# Patient Record
Sex: MALE | Race: WHITE | NOT HISPANIC OR LATINO | Employment: FULL TIME | URBAN - METROPOLITAN AREA
[De-identification: names, ages, dates, MRNs, and addresses within clinical notes are randomized per-mention and may not be internally consistent; named-entity substitution may affect disease eponyms.]

---

## 2022-08-23 NOTE — PROGRESS NOTES
"[facility] CARDIOLOGY    New Patient Office Visit    Patient Name: Ric Nickerson  : 1975   MRN: [id number]   Care Team: Patient Care Team:  Milly Cunningham APRN as PCP - General (Nurse Practitioner)  Chema Rodriguez MD as Cardiologist (Cardiology)  Milly Cunningham APRN as Nurse Practitioner (Nurse Practitioner)    Chief Complaint   Patient presents with   • Chest Pain   • Dizziness   • Irregular Heart Beat     HPI: Ric Nickerson is a 47 y.o. male who presents today for new diagnosis of atrial fibrillation.  In July he had a day where he was surprised by his alarm clock in the morning and began having palpitations afterwards.  He describes a sensation as \"butterflies\" but denied any significant chest pain or dyspnea.  The feeling persisted and he was able to drive himself to the emergency department where he was diagnosed with A. fib with RVR.  He was admitted to the hospital and treated with a medication which sounded like Cardene when I listed multiple medications for him and he reverted to sinus rhythm.  Since that time he denies any further episodes of palpitations.  He was referred for home sleep study by his primary care NP which was reportedly positive and he is now working on scheduling a full sleep study.  He does report some alcohol use throughout the week.    He does report episodes of palpitations around  and he underwent heart catheterization here at that time.  However, he decreased his caffeine intake and stop smoking and did not have palpitations after that.    Subjective   Review of Systems   Constitutional: Negative for activity change, unexpected weight gain and unexpected weight loss.   HENT: Negative.    Eyes: Negative.    Respiratory: Positive for apnea. Negative for shortness of breath.         Snoring   Cardiovascular: Positive for palpitations. Negative for chest pain and leg swelling.   Endocrine: Negative.    Genitourinary: " "Negative.    Musculoskeletal: Negative.    Skin: Negative.    Allergic/Immunologic: Negative.    Neurological: Negative.    Hematological: Negative.    Psychiatric/Behavioral: The patient is nervous/anxious.        Past Medical History:   Diagnosis Date   • Abnormal ECG    • Anxiety    • Atrial fibrillation (HCC)    • Gout        Past Surgical History:   Procedure Laterality Date   • CARDIAC CATHETERIZATION       Social History     Socioeconomic History   • Marital status:    Tobacco Use   • Smoking status: Former Smoker     Quit date:      Years since quittin.6   • Smokeless tobacco: Current User     Types: Chew   Substance and Sexual Activity   • Alcohol use: Yes     Alcohol/week: 4.0 - 5.0 standard drinks     Types: 4 - 5 Cans of beer per week   • Drug use: Never   • Sexual activity: Defer       Family History   Problem Relation Age of Onset   • No Known Problems Mother    • No Known Problems Father        Social History     Tobacco Use   Smoking Status Former Smoker   • Quit date:    • Years since quittin.   Smokeless Tobacco Current User   • Types: Chew     No Known Allergies      Current Outpatient Medications:   •  aspirin 81 MG EC tablet, Take 81 mg by mouth Daily., Disp: , Rfl:   •  metoprolol succinate XL (TOPROL-XL) 25 MG 24 hr tablet, Take 25 mg by mouth Daily., Disp: , Rfl:     Objective     Vitals:    22 0944   BP: 118/76   BP Location: Right arm   Patient Position: Sitting   Pulse: 69   SpO2: 98%   Weight: 132 kg (292 lb)   Height: 182.9 cm (72\")     Body mass index is 39.6 kg/m².    Physical Exam  Constitutional:       Appearance: Normal appearance.   HENT:      Head: Normocephalic and atraumatic.      Nose: Nose normal. No congestion.      Mouth/Throat:      Mouth: Mucous membranes are moist.   Eyes:      General: No scleral icterus.     Conjunctiva/sclera: Conjunctivae normal.   Neck:      Vascular: No carotid bruit.   Cardiovascular:      Rate and Rhythm: Normal " rate and regular rhythm.      Pulses: Normal pulses.      Heart sounds: No murmur heard.  Pulmonary:      Effort: Pulmonary effort is normal. No respiratory distress.      Breath sounds: Normal breath sounds. No wheezing or rales.   Abdominal:      General: Bowel sounds are normal. There is no distension.      Palpations: Abdomen is soft.      Tenderness: There is no abdominal tenderness.   Musculoskeletal:         General: No swelling. Normal range of motion.      Cervical back: Normal range of motion and neck supple.   Skin:     General: Skin is warm and dry.      Capillary Refill: Capillary refill takes less than 2 seconds.      Coloration: Skin is not jaundiced.   Neurological:      General: No focal deficit present.      Mental Status: He is alert and oriented to person, place, and time.      Gait: Gait normal.   Psychiatric:         Mood and Affect: Mood normal.         Behavior: Behavior normal.         Thought Content: Thought content normal.         Judgment: Judgment normal.         RESULTS:     Labs:    Most recent PCP note, imaging tests, and labs reviewed.   - CBC: WBC 6.91, hemoglobin 16.2, hematocrit 47.8%, platelets 260   - CMP: Creatinine 1.26, glucose 143    Transthoracic echocardiogram report 7/6/22   - LV is normal in size, LV wall thickness is normal, LV wall motion is normal, LVEF 55 to 60%   - LV diastolic pattern is normal for the age of the patient   - There is no hemodynamically significant valvular stenosis or regurgitation   - Normal pulmonary pressure estimate   - Normal atrial size      ECG 12 Lead    Date/Time: 8/24/2022 10:18 AM  Performed by: Chema Rodriguez MD  Authorized by: Chema Rodriguez MD   Previous ECG: no previous ECG available  Rhythm: sinus rhythm  Rate: normal  BPM: 69  Conduction: conduction normal  ST Segments: ST segments normal  T Waves: T waves normal  QRS axis: normal  Other: no other findings    Clinical impression: normal ECG            Assessment & Plan        ICD-10-CM ICD-9-CM   1. Paroxysmal atrial fibrillation (HCC)  I48.0 427.31     [id number] Score: 0 (8/24/2022 10:19 AM)   -Continue metoprolol and aspirin   - Home sleep study was reportedly positive and working to get an appointment for a forma sleep study   - Will try to obtain full records from his hospitalization at [facility] to ensure that thyroid studies were done    Return in about 6 months (around 2/24/2023).    MAGALI Rodriguez MD, MS  08/24/22    [facility] Cardiology  [address] [place] [id number] [phone]

## 2022-08-24 ENCOUNTER — OFFICE VISIT (OUTPATIENT)
Dept: CARDIOLOGY | Facility: CLINIC | Age: 47
End: 2022-08-24

## 2022-08-24 VITALS
HEART RATE: 69 BPM | OXYGEN SATURATION: 98 % | DIASTOLIC BLOOD PRESSURE: 76 MMHG | HEIGHT: 72 IN | BODY MASS INDEX: 39.55 KG/M2 | WEIGHT: 292 LBS | SYSTOLIC BLOOD PRESSURE: 118 MMHG

## 2022-08-24 DIAGNOSIS — I48.0 PAROXYSMAL ATRIAL FIBRILLATION: Primary | ICD-10-CM

## 2022-08-24 PROCEDURE — 93000 ELECTROCARDIOGRAM COMPLETE: CPT | Performed by: INTERNAL MEDICINE

## 2022-08-24 PROCEDURE — 99203 OFFICE O/P NEW LOW 30 MIN: CPT | Performed by: INTERNAL MEDICINE

## 2022-08-24 RX ORDER — ASPIRIN 81 MG/1
81 TABLET ORAL DAILY
COMMUNITY

## 2022-08-24 RX ORDER — METOPROLOL SUCCINATE 25 MG/1
25 TABLET, EXTENDED RELEASE ORAL DAILY
COMMUNITY
Start: 2022-08-04

## 2023-11-06 NOTE — PROGRESS NOTES
[facility] CARDIOLOGY    Established Patient Office Visit    Patient Name: Ric Nickerson  : 1975   MRN: [id number]   Care Team: Patient Care Team:  Milly Cunningham APRN as PCP - General (Nurse Practitioner)  Chema Rodriguez MD as Cardiologist (Cardiology)  Milly Cunningham APRN as Nurse Practitioner (Nurse Practitioner)    Chief Complaint   Patient presents with    Atrial Fibrillation     HPI: Ric Nickerson is a 48 y.o. male who presents today for routine follow-up of paroxysmal atrial fibrillation atrial fibrillation.  In 2023 he had a day where he was surprised by his alarm clock in the morning and began having palpitations afterwards. The feeling persisted and he was able to drive himself to the emergency department where he was diagnosed with A. fib with RVR.  He was given medications for rate control with reversion to sinus rhythm and he underwent echocardiography prior to discharge.    Since his initial visit in 2022 he denies any further episodes of palpitations.  He had been diagnosed with sleep apnea however was unable to tolerate various masks and currently is not using any.  He does admit that he has gained weight recently and this about the heaviest he has ever been.  He does have a history of successful dieting in the past with weight loss of 30 to 40 pounds however with regaining weight when he returns to poor dietary habits.    Subjective   Review of Systems   Constitutional:  Negative for activity change.   Respiratory:  Positive for apnea. Negative for shortness of breath.         Snoring   Cardiovascular:  Negative for chest pain, palpitations and leg swelling.       Past Medical History:   Diagnosis Date    Abnormal ECG     Anxiety     Atrial fibrillation     Gout     Hypertension        Past Surgical History:   Procedure Laterality Date    CARDIAC CATHETERIZATION       Social History     Socioeconomic History    Marital  "status:    Tobacco Use    Smoking status: Former    Smokeless tobacco: Current     Types: Chew   Vaping Use    Vaping Use: Never used   Substance and Sexual Activity    Alcohol use: Yes     Alcohol/week: 4.0 - 5.0 standard drinks of alcohol     Types: 4 - 5 Cans of beer per week     Comment: occ    Drug use: Never    Sexual activity: Defer       Family History   Problem Relation Age of Onset    No Known Problems Mother     No Known Problems Father        Social History     Tobacco Use   Smoking Status Former   Smokeless Tobacco Current    Types: Chew     No Known Allergies      Current Outpatient Medications:     allopurinol (ZYLOPRIM) 100 MG tablet, Take 1 tablet by mouth Daily., Disp: , Rfl:     aspirin 81 MG EC tablet, Take 1 tablet by mouth Daily., Disp: , Rfl:     metoprolol succinate XL (TOPROL-XL) 25 MG 24 hr tablet, Take 1 tablet by mouth Daily., Disp: , Rfl:     Objective     Vitals:    11/08/23 0941   BP: 134/82   BP Location: Left arm   Patient Position: Sitting   Pulse: 75   SpO2: 98%   Weight: (!) 142 kg (312 lb 9.6 oz)   Height: 182.9 cm (72\")   Body mass index is 42.4 kg/m².    Physical Exam  Constitutional:       Appearance: Normal appearance.   HENT:      Head: Normocephalic and atraumatic.      Nose: Nose normal. No congestion.      Mouth/Throat:      Mouth: Mucous membranes are moist.   Eyes:      General: No scleral icterus.     Conjunctiva/sclera: Conjunctivae normal.   Neck:      Vascular: No carotid bruit.   Cardiovascular:      Rate and Rhythm: Normal rate and regular rhythm.      Pulses: Normal pulses.      Heart sounds: No murmur heard.  Pulmonary:      Effort: Pulmonary effort is normal. No respiratory distress.      Breath sounds: Normal breath sounds. No wheezing or rales.   Abdominal:      General: Bowel sounds are normal. There is no distension.      Palpations: Abdomen is soft.      Tenderness: There is no abdominal tenderness.   Musculoskeletal:         General: No swelling. " Normal range of motion.      Cervical back: Normal range of motion and neck supple.   Skin:     General: Skin is warm and dry.      Capillary Refill: Capillary refill takes less than 2 seconds.      Coloration: Skin is not jaundiced.   Neurological:      General: No focal deficit present.      Mental Status: He is alert and oriented to person, place, and time.      Gait: Gait normal.   Psychiatric:         Mood and Affect: Mood normal.         Behavior: Behavior normal.         Thought Content: Thought content normal.         Judgment: Judgment normal.         RESULTS:     Labs:    Most recent PCP note, imaging tests, and labs reviewed.    Labs 7/6/22   - CBC: WBC 6.91, hemoglobin 16.2, hematocrit 47.8%, platelets 260   - CMP: Creatinine 1.26, glucose 143    Procedures    [facility]   - LV is normal in size, LV wall thickness is normal, LV wall motion is normal, LVEF 55 to 60%   - LV diastolic pattern is normal for the age of the patient   - There is no hemodynamically significant valvular stenosis or regurgitation   - mildly thickened MV, trace MR   - Normal pulmonary pressure estimate   - Normal atrial size    Assessment & Plan       ICD-10-CM ICD-9-CM   1. Paroxysmal atrial fibrillation  I48.0 427.31   2. Class 3 severe obesity due to excess calories without serious comorbidity with body mass index (BMI) of 40.0 to 44.9 in adult  E66.01 278.01    Z68.41 V85.41   3. Obstructive sleep apnea syndrome  G47.33 327.23     PAF   -Continue metoprolol and aspirin   - Home sleep study positive however has been intolerant to CPAP   - Previously normal thyroid studies   - Encouraged the patient to work on weight loss.  Offered referral to nutrition and weight loss clinic which he declined at this time.    Return in about 1 year (around 11/8/2024).    MAGALI Rodriguez MD, MS  11/08/23    [facility] Cardiology  [address] [facility]  [address] [place] [address]  [place] [address]  [phone]

## 2023-11-08 ENCOUNTER — OFFICE VISIT (OUTPATIENT)
Dept: CARDIOLOGY | Facility: CLINIC | Age: 48
End: 2023-11-08
Payer: COMMERCIAL

## 2023-11-08 VITALS
HEIGHT: 72 IN | WEIGHT: 312.6 LBS | DIASTOLIC BLOOD PRESSURE: 82 MMHG | BODY MASS INDEX: 42.34 KG/M2 | SYSTOLIC BLOOD PRESSURE: 134 MMHG | OXYGEN SATURATION: 98 % | HEART RATE: 75 BPM

## 2023-11-08 DIAGNOSIS — G47.33 OBSTRUCTIVE SLEEP APNEA SYNDROME: ICD-10-CM

## 2023-11-08 DIAGNOSIS — I48.0 PAROXYSMAL ATRIAL FIBRILLATION: Primary | ICD-10-CM

## 2023-11-08 DIAGNOSIS — E66.01 CLASS 3 SEVERE OBESITY DUE TO EXCESS CALORIES WITHOUT SERIOUS COMORBIDITY WITH BODY MASS INDEX (BMI) OF 40.0 TO 44.9 IN ADULT: ICD-10-CM

## 2023-11-08 PROCEDURE — 99213 OFFICE O/P EST LOW 20 MIN: CPT | Performed by: INTERNAL MEDICINE

## 2023-11-08 RX ORDER — ALLOPURINOL 100 MG/1
100 TABLET ORAL DAILY
COMMUNITY
Start: 2023-10-24